# Patient Record
Sex: MALE | Race: WHITE | NOT HISPANIC OR LATINO | Employment: FULL TIME | URBAN - METROPOLITAN AREA
[De-identification: names, ages, dates, MRNs, and addresses within clinical notes are randomized per-mention and may not be internally consistent; named-entity substitution may affect disease eponyms.]

---

## 2018-02-22 ENCOUNTER — OFFICE VISIT CONVERTED (OUTPATIENT)
Dept: SURGERY | Facility: CLINIC | Age: 53
End: 2018-02-22
Attending: SURGERY

## 2018-05-07 ENCOUNTER — OFFICE VISIT CONVERTED (OUTPATIENT)
Dept: SURGERY | Facility: CLINIC | Age: 53
End: 2018-05-07
Attending: SURGERY

## 2018-05-07 ENCOUNTER — CONVERSION ENCOUNTER (OUTPATIENT)
Dept: SURGERY | Facility: CLINIC | Age: 53
End: 2018-05-07

## 2018-12-04 ENCOUNTER — OFFICE VISIT CONVERTED (OUTPATIENT)
Dept: FAMILY MEDICINE CLINIC | Facility: CLINIC | Age: 53
End: 2018-12-04
Attending: NURSE PRACTITIONER

## 2020-03-03 ENCOUNTER — OFFICE VISIT CONVERTED (OUTPATIENT)
Dept: FAMILY MEDICINE CLINIC | Facility: CLINIC | Age: 55
End: 2020-03-03
Attending: NURSE PRACTITIONER

## 2020-03-04 ENCOUNTER — HOSPITAL ENCOUNTER (OUTPATIENT)
Dept: GENERAL RADIOLOGY | Facility: HOSPITAL | Age: 55
Discharge: HOME | End: 2020-03-04
Attending: NURSE PRACTITIONER

## 2020-04-09 ENCOUNTER — OFFICE VISIT CONVERTED (OUTPATIENT)
Dept: ORTHOPEDIC SURGERY | Facility: CLINIC | Age: 55
End: 2020-04-09
Attending: ORTHOPAEDIC SURGERY

## 2020-09-01 ENCOUNTER — OFFICE VISIT CONVERTED (OUTPATIENT)
Dept: FAMILY MEDICINE CLINIC | Facility: CLINIC | Age: 55
End: 2020-09-01
Attending: NURSE PRACTITIONER

## 2020-09-01 ENCOUNTER — HOSPITAL ENCOUNTER (OUTPATIENT)
Dept: GENERAL RADIOLOGY | Facility: HOSPITAL | Age: 55
Discharge: HOME | End: 2020-09-01
Attending: NURSE PRACTITIONER

## 2020-09-11 ENCOUNTER — OFFICE VISIT CONVERTED (OUTPATIENT)
Dept: ORTHOPEDIC SURGERY | Facility: CLINIC | Age: 55
End: 2020-09-11
Attending: ORTHOPAEDIC SURGERY

## 2020-09-11 ENCOUNTER — CONVERSION ENCOUNTER (OUTPATIENT)
Dept: ORTHOPEDIC SURGERY | Facility: CLINIC | Age: 55
End: 2020-09-11

## 2021-05-10 NOTE — H&P
History and Physical      Patient Name: Denny Ratliff   Patient ID: [id number]   Sex: Male   YOB: 1965    Primary Care Provider: Randy GARCIA   Referring Provider: Randy GARCIA    Visit Date: September 11, 2020    Provider: Carl Robertson MD   Location: [facility]   Location Address: [address] [place] [address]  [id number]   Location Phone: [phone]          Chief Complaint  · Right shoulder pain      History Of Present Illness  Denny Ratliff is a 55 year old /White male who presents today to [facility].      The patient presents here today for evaluation of right shoulder.  Patient states he fell to the ground about 6 weeks ago and his pain is not improving. He previously seen his PCP for his shoulder and had x-ray at [facility]. The patient is overall healthy and has no other complaints.       Past Medical History  ***No Significant Medical History; Broken Bones; Incomplete tear of left rotator cuff; Left shoulder pain; Pharyngitis; Subacromial impingement, left         Past Surgical History  Colonoscopy         Medication List  diclofenac sodium 75 mg oral tablet,delayed release (DR/EC)         Allergy List  NO KNOWN DRUG ALLERGIES       Allergies Reconciled  Family Medical History  Diabetes, unspecified type         Social History  Alcohol (Never); Alcohol Use (Never); Caffeine (Current every day); lives with spouse; .; Recreational Drug Use (Never); Second hand smoke exposure (Never); Tobacco (Never); Working         Review of Systems  · Constitutional  o Denies  o : fever, chills, weight loss  · Cardiovascular  o Denies  o : chest pain, shortness of breath  · Gastrointestinal  o Denies  o : liver disease, heartburn, nausea, blood in stools  · Genitourinary  o Denies  o : painful urination, blood in urine  · Integument  o Denies  o : rash, itching  · Neurologic  o Denies  o : headache, weakness, loss of  "consciousness  · Musculoskeletal  o Denies  o : painful, swollen joints  · Psychiatric  o Denies  o : drug/alcohol addiction, anxiety, depression      Vitals  Date Time BP Position Site L\R Cuff Size HR RR TEMP (F) WT  HT  BMI kg/m2 BSA m2 O2 Sat HC       09/11/2020 09:20 AM         165lbs 1oz 6'  1\" 21.78 1.96           Physical Examination  · Constitutional  o Appearance  o : well developed, well-nourished, no obvious deformities present  · Head and Face  o Head  o :   § Inspection  § : normocephalic  o Face  o :   § Inspection  § : no facial lesions  · Eyes  o Conjunctivae  o : conjunctivae normal  o Sclerae  o : sclerae white  · Ears, Nose, Mouth and Throat  o Ears  o :   § External Ears  § : appearance within normal limits  § Hearing  § : intact  o Nose  o :   § External Nose  § : appearance normal  · Neck  o Inspection/Palpation  o : normal appearance  o Range of Motion  o : full range of motion  · Respiratory  o Respiratory Effort  o : breathing unlabored  o Inspection of Chest  o : normal appearance  o Auscultation of Lungs  o : no audible wheezing or rales  · Cardiovascular  o Heart  o : regular rate  · Gastrointestinal  o Abdominal Examination  o : soft and non-tender  · Skin and Subcutaneous Tissue  o General Inspection  o : intact, no rashes  · Psychiatric  o General  o : Alert and oriented x3  o Judgement and Insight  o : judgment and insight intact  o Mood and Affect  o : mood normal, affect appropriate  · Right Shoulder  o Inspection  o : Burning pain deltoid and bicep area. no numbness or tingling. Tender over anterior lateral shoulder. 175 elevation; Abduction 155; ER 90; IR 45. 5/5 Supraspinatus strength; IR T10; 5/5/ infrared subscap; Positive Patricia.   · Injection Note/Aspiration Note  o Site  o : Right shoulder   o Procedure  o : Procedure: After educating the patient, patient gave consent for procedure. After using Chloraprep, the joint space was injected. The patient tolerated the procedure " well.  o Medication  o : 80 mg of DepoMedrol with 9cc of 1% Lidocaine  · Imaging  o Imaging  o : Xray [facility] 09/2020: 1. Mild acromioclavicular osteoarthritis 2. No acute fracture or malalignment           Assessment  · Right shoulder pain, unspecified chronicity     719.41/M25.511  · Rotator cuff tendinitis, right     726.10/M75.81  · Impingement syndrome, shoulder, right     726.2/M75.41  · Acromioclavicular joint arthritis     716.91/M19.019      Plan  · Orders  o Depo-Medrol injection 80mg () - - 09/11/2020   Lot [id number] Exp 07 2021 [employer] Administered by MAUREEN JOHNSON MD  o Shoulder Intra-articular Injection without US Guidance [facility] ([id number]) - - 09/11/2020   Lot [id number] Exp 08 01 2021 [employer] Administered by MAUREEN JOHNSON MD  · Medications  o Medications have been Reconciled  o Transition of Care or Provider Policy  · Instructions  o X-rays reviewed by Dr. Johnson.  o Reviewed the patient's Past Medical, Social, and Family history as well as the ROS at today's visit, no changes.  o Call or return if worsening symptoms.  o Follow up in 6 weeks.  o The above service was scribed by Su Manjarrez on my behalf and I attest to the accuracy of the note. jsb  o Discussed the risks and benefits of a steroid injection in the right shoulder. Patient verbalized understanding and wished to proceed with a steroid injection in the right shoulder at this time. Plan for subacromial injection. The patient tolerated this well. Plan for Activity modifications. Follow up 6-8 weeks. May consider MRI if symptoms persist.   o Electronically Identified Patient Education Materials Provided Electronically            Electronically Signed by: Su Manjarrez MA -Author on September 11, 2020 03:47:47 PM  Electronically Co-signed by: Maureen Johnson MD -Reviewer on September 14, 2020 09:38:59 PM
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
## 2021-05-14 VITALS
SYSTOLIC BLOOD PRESSURE: 118 MMHG | OXYGEN SATURATION: 96 % | DIASTOLIC BLOOD PRESSURE: 74 MMHG | HEART RATE: 79 BPM | BODY MASS INDEX: 23.06 KG/M2 | HEIGHT: 73 IN | TEMPERATURE: 97.5 F | WEIGHT: 174 LBS | RESPIRATION RATE: 18 BRPM

## 2021-05-14 VITALS — BODY MASS INDEX: 21.88 KG/M2 | WEIGHT: 165.06 LBS | HEIGHT: 73 IN

## 2021-05-15 VITALS
HEIGHT: 73 IN | OXYGEN SATURATION: 96 % | HEART RATE: 87 BPM | TEMPERATURE: 97.9 F | BODY MASS INDEX: 21.87 KG/M2 | WEIGHT: 165 LBS | SYSTOLIC BLOOD PRESSURE: 130 MMHG | RESPIRATION RATE: 16 BRPM | DIASTOLIC BLOOD PRESSURE: 82 MMHG

## 2021-05-15 VITALS — RESPIRATION RATE: 16 BRPM | HEIGHT: 73 IN | WEIGHT: 165 LBS | BODY MASS INDEX: 21.87 KG/M2

## 2021-05-16 VITALS
DIASTOLIC BLOOD PRESSURE: 71 MMHG | HEIGHT: 73 IN | OXYGEN SATURATION: 99 % | RESPIRATION RATE: 16 BRPM | BODY MASS INDEX: 20.28 KG/M2 | SYSTOLIC BLOOD PRESSURE: 112 MMHG | HEART RATE: 71 BPM | TEMPERATURE: 97.2 F | WEIGHT: 153 LBS

## 2021-05-16 VITALS — RESPIRATION RATE: 14 BRPM | WEIGHT: 175 LBS | BODY MASS INDEX: 23.19 KG/M2 | HEIGHT: 73 IN

## 2021-05-16 VITALS — BODY MASS INDEX: 23.46 KG/M2 | RESPIRATION RATE: 14 BRPM | HEIGHT: 73 IN | WEIGHT: 177 LBS

## 2021-10-05 ENCOUNTER — OFFICE VISIT (OUTPATIENT)
Dept: FAMILY MEDICINE CLINIC | Facility: CLINIC | Age: 56
End: 2021-10-05

## 2021-10-05 VITALS
RESPIRATION RATE: 18 BRPM | WEIGHT: 183 LBS | BODY MASS INDEX: 24.25 KG/M2 | DIASTOLIC BLOOD PRESSURE: 82 MMHG | SYSTOLIC BLOOD PRESSURE: 130 MMHG | TEMPERATURE: 98 F | OXYGEN SATURATION: 98 % | HEART RATE: 98 BPM | HEIGHT: 73 IN

## 2021-10-05 DIAGNOSIS — J01.00 ACUTE NON-RECURRENT MAXILLARY SINUSITIS: Primary | ICD-10-CM

## 2021-10-05 DIAGNOSIS — J06.9 ACUTE URI: ICD-10-CM

## 2021-10-05 PROCEDURE — 99213 OFFICE O/P EST LOW 20 MIN: CPT | Performed by: STUDENT IN AN ORGANIZED HEALTH CARE EDUCATION/TRAINING PROGRAM

## 2021-10-05 RX ORDER — PREDNISONE 20 MG/1
20 TABLET ORAL DAILY
Qty: 5 TABLET | Refills: 0 | Status: SHIPPED | OUTPATIENT
Start: 2021-10-05 | End: 2021-12-15

## 2021-10-05 RX ORDER — AZITHROMYCIN 250 MG/1
TABLET, FILM COATED ORAL
Qty: 6 TABLET | Refills: 0 | Status: SHIPPED | OUTPATIENT
Start: 2021-10-05 | End: 2021-12-15

## 2021-10-05 NOTE — PROGRESS NOTES
"Chief Complaint  Complaining of sore throat and sinus pressure    Subjective         Denny Ratliff is a 56 y.o. male who presents to [facility]  56 years old male comes to the clinic today for an acute visit.    Patient is complaining of 10 days history of sinus pressure, mild throat irritation and ear discomfort.  Denies any fever/cough/chest pain or shortness of breath.    Denies any sick contact or recent travel.  Patient reports he gets this kind of symptoms once a year and usually resolves with Z-Donaldo and steroid.  Review of Systems   Objective   Vital Signs:   Vitals:    10/05/21 1559   BP: 130/82   Pulse: 98   Resp: 18   Temp: 98 °F (36.7 °C)   SpO2: 98%   Weight: 83 kg (183 lb)   Height: 185.4 cm (73\")      Body mass index is 24.14 kg/m².   Physical Exam  HENT:      Right Ear: Tympanic membrane normal.      Left Ear: Tympanic membrane normal.      Mouth/Throat:      Pharynx: Pharyngeal swelling present.      Comments: Positive sinus tenderness  Pulmonary:      Effort: Pulmonary effort is normal.      Breath sounds: Normal breath sounds.                   Assessment and Plan   Diagnoses and all orders for this visit:    1. Acute non-recurrent maxillary sinusitis (Primary)  -     azithromycin (Zithromax Z-Donaldo) 250 MG tablet; Take 2 tablets by mouth on day 1, then 1 tablet daily on days 2-5  Dispense: 6 tablet; Refill: 0  -     predniSONE (DELTASONE) 20 MG tablet; Take 1 tablet by mouth Daily.  Dispense: 5 tablet; Refill: 0    2. Acute URI  -     azithromycin (Zithromax Z-Donaldo) 250 MG tablet; Take 2 tablets by mouth on day 1, then 1 tablet daily on days 2-5  Dispense: 6 tablet; Refill: 0  -     predniSONE (DELTASONE) 20 MG tablet; Take 1 tablet by mouth Daily.  Dispense: 5 tablet; Refill: 0        Patient declined Covid/flu/strep test  Will empirically treat patient with Z-Doanldo and prednisone; delayed prescription discussed  Patient to call the clinic if no improvement or any " worsening    Follow Up   Return if symptoms worsen or fail to improve.  Patient was given instructions and counseling regarding his condition or for health maintenance advice. Please see specific information pulled into the AVS if appropriate.

## 2021-12-10 ENCOUNTER — TELEPHONE (OUTPATIENT)
Dept: FAMILY MEDICINE CLINIC | Facility: CLINIC | Age: 56
End: 2021-12-10

## 2021-12-10 NOTE — TELEPHONE ENCOUNTER
Informed patient that it would be best if he went to the urgent care for treatment since we do not have any open appointments. He wanted to schedule an appointment for next week and I transferred him up front to do that.

## 2021-12-10 NOTE — TELEPHONE ENCOUNTER
Patient has a rash about 2 inches wide from underneath nipple wrapping all the way to their back.  Patient was trying to get in today advised patient we didn't have openings until next week. I would send a message back and see how he needs to proceed if he needs to make an appt with a provider next week or go to an urgent care if symptoms get uncontrollable.

## 2021-12-12 PROBLEM — M75.42 SUBACROMIAL IMPINGEMENT, LEFT: Status: ACTIVE | Noted: 2017-02-07

## 2021-12-12 PROBLEM — T14.8XXA BROKEN BONES: Status: ACTIVE | Noted: 2021-12-12

## 2021-12-12 PROBLEM — M25.512 LEFT SHOULDER PAIN: Status: ACTIVE | Noted: 2021-12-12

## 2021-12-12 PROBLEM — M75.100 NONTRAUMATIC TEAR OF ROTATOR CUFF: Status: ACTIVE | Noted: 2017-02-07

## 2021-12-15 ENCOUNTER — OFFICE VISIT (OUTPATIENT)
Dept: FAMILY MEDICINE CLINIC | Facility: CLINIC | Age: 56
End: 2021-12-15

## 2021-12-15 VITALS
BODY MASS INDEX: 24.39 KG/M2 | DIASTOLIC BLOOD PRESSURE: 110 MMHG | TEMPERATURE: 97.8 F | WEIGHT: 184 LBS | OXYGEN SATURATION: 98 % | HEART RATE: 74 BPM | SYSTOLIC BLOOD PRESSURE: 152 MMHG | HEIGHT: 73 IN

## 2021-12-15 DIAGNOSIS — B02.9 HERPES ZOSTER WITHOUT COMPLICATION: Primary | ICD-10-CM

## 2021-12-15 PROCEDURE — 80305 DRUG TEST PRSMV DIR OPT OBS: CPT | Performed by: NURSE PRACTITIONER

## 2021-12-15 PROCEDURE — 99213 OFFICE O/P EST LOW 20 MIN: CPT | Performed by: NURSE PRACTITIONER

## 2021-12-15 RX ORDER — GABAPENTIN 300 MG/1
300 CAPSULE ORAL 3 TIMES DAILY PRN
Qty: 60 CAPSULE | Refills: 0 | Status: SHIPPED | OUTPATIENT
Start: 2021-12-15 | End: 2022-01-03

## 2021-12-15 NOTE — PROGRESS NOTES
"Chief Complaint  Herpes Zoster (On right side and back )    Subjective          Denny Ratliff presents to [facility]  Presents to the office today for follow-up regarding his shingles.  Patient states that this started about 2 weeks ago.  He states that he was seen in urgent care where he was placed on Valtrex.  Patient states that the rash is still very uncomfortable.  He states that the area burns and he finds it hard to get comfortable especially when sleeping.  Does still have Valtrex that he is taking.  I did discuss starting gabapentin for the pain.      Objective   Vital Signs:   BP (!) 152/110 (BP Location: Right arm, Patient Position: Sitting, Cuff Size: Adult)   Pulse 74   Temp 97.8 °F (36.6 °C) (Temporal)   Ht 185.4 cm (73\")   Wt 83.5 kg (184 lb)   SpO2 98%   BMI 24.28 kg/m²     Physical Exam  Vitals reviewed.   Constitutional:       Appearance: Normal appearance.   Cardiovascular:      Rate and Rhythm: Normal rate and regular rhythm.      Heart sounds: Normal heart sounds, S1 normal and S2 normal. No murmur heard.      Pulmonary:      Effort: Pulmonary effort is normal. No respiratory distress.      Breath sounds: Normal breath sounds.   Skin:     General: Skin is warm and dry.      Findings: Rash present. Rash is vesicular.             Comments: Vesicular rash noted to the right torso in different stages.     Neurological:      Mental Status: He is alert and oriented to person, place, and time.   Psychiatric:         Attention and Perception: Attention normal.         Mood and Affect: Mood normal.         Behavior: Behavior normal.        Result Review :                Assessment and Plan    Diagnoses and all orders for this visit:    1. Herpes zoster without complication (Primary)  -     gabapentin (Neurontin) 300 MG capsule; Take 1 capsule by mouth 3 (Three) Times a Day As Needed (shingles).  Dispense: 60 capsule; Refill: 0    Continue Valtrex as prescribed. " Instructed the patient to please reach out to the office after Valtrex is completed to update us on the rash    Follow Up   Return if symptoms worsen or fail to improve.  Patient was given instructions and counseling regarding his condition or for health maintenance advice. Please see specific information pulled into the AVS if appropriate.

## 2022-01-03 DIAGNOSIS — B02.9 HERPES ZOSTER WITHOUT COMPLICATION: ICD-10-CM

## 2022-01-03 RX ORDER — GABAPENTIN 300 MG/1
CAPSULE ORAL
Qty: 60 CAPSULE | Refills: 0 | Status: SHIPPED | OUTPATIENT
Start: 2022-01-03

## 2022-10-05 ENCOUNTER — OFFICE VISIT (OUTPATIENT)
Dept: FAMILY MEDICINE CLINIC | Facility: CLINIC | Age: 57
End: 2022-10-05

## 2022-10-05 VITALS
TEMPERATURE: 98 F | DIASTOLIC BLOOD PRESSURE: 66 MMHG | HEART RATE: 93 BPM | OXYGEN SATURATION: 96 % | RESPIRATION RATE: 17 BRPM | SYSTOLIC BLOOD PRESSURE: 116 MMHG

## 2022-10-05 DIAGNOSIS — K59.01 SLOW TRANSIT CONSTIPATION: Primary | ICD-10-CM

## 2022-10-05 PROCEDURE — 99213 OFFICE O/P EST LOW 20 MIN: CPT

## 2022-10-05 NOTE — PROGRESS NOTES
Denny Ratliff presents to [facility] with complaints of constipation.      History of Present Illness  This is a 57-year-old male who presents to clinic with complaints of constipation.    Patient states that his constipation really started a couple months ago, states that he is unsure what really triggered it, but did start taking a stool softener over-the-counter which seems to make his bowel movements more regular.  States that he does have a bowel movement about every day, regular in consistency, and states that his constipation is almost completely resolved.  States the main concern is that he knows that colon cancer can cause constipation, and thus came in for evaluation.  Denies any blood in his stool, does have recent colonoscopy about 4 years ago which had no abnormalities noted.  Denies any nausea or vomiting or abdominal pain, denies any other associated symptoms.  Does state that he drinks about 2 gallons of tea a day, as well as sodas.  Diet is high in carbohydrates.    The following portions of the patient's history were personally reviewed and updated as appropriate: allergies, current medications, past medical history, past surgical history, past family history, and past social history.       Objective   Vital Signs:   /66   Pulse 93   Temp 98 °F (36.7 °C)   Resp 17   SpO2 96%     There is no height or weight on file to calculate BMI.    All labs, imaging, test results, and specialty provider notes reviewed with patient.     Physical Exam  Vitals reviewed.   Constitutional:       Appearance: Normal appearance.   Cardiovascular:      Rate and Rhythm: Normal rate and regular rhythm.      Pulses: Normal pulses.      Heart sounds: Normal heart sounds.   Pulmonary:      Effort: Pulmonary effort is normal.      Breath sounds: Normal breath sounds.   Neurological:      General: No focal deficit present.      Mental Status: He is alert and oriented to person,  place, and time.            Assessment and Plan:  Diagnoses and all orders for this visit:    1. Slow transit constipation (Primary)    Discussed with patient that likely his cause of constipation is because he is not drinking enough water, is getting dehydrated, and also because of his diet.  Discussed with patient to increase his water intake, adjust his diet to include foods low in carbs, increase his vegetable and fruit intake.  Did also discussed with patient to follow-up with his PCP soon, as he can send him to GI for further evaluation and possible repeat colonoscopy if colon cancer is a concern of his.  Do not think that this is a risk, as he did just have a normal colonoscopy about 4 years ago.      Follow Up:  Return in about 4 weeks (around 11/2/2022), or WITH pcp.    Patient was given instructions and counseling regarding his condition or for health maintenance advice. Please see specific information pulled into the AVS if appropriate.

## 2024-01-25 ENCOUNTER — OFFICE VISIT (OUTPATIENT)
Dept: ORTHOPEDIC SURGERY | Facility: CLINIC | Age: 59
End: 2024-01-25
Payer: COMMERCIAL

## 2024-01-25 VITALS
OXYGEN SATURATION: 95 % | DIASTOLIC BLOOD PRESSURE: 77 MMHG | HEART RATE: 93 BPM | HEIGHT: 73 IN | WEIGHT: 184 LBS | BODY MASS INDEX: 24.39 KG/M2 | SYSTOLIC BLOOD PRESSURE: 120 MMHG

## 2024-01-25 DIAGNOSIS — M25.562 LEFT KNEE PAIN, UNSPECIFIED CHRONICITY: Primary | ICD-10-CM

## 2024-01-25 DIAGNOSIS — M11.20 CHONDROCALCINOSIS: ICD-10-CM

## 2024-01-25 DIAGNOSIS — M17.12 PRIMARY OSTEOARTHRITIS OF LEFT KNEE: ICD-10-CM

## 2024-01-25 RX ORDER — LIDOCAINE HYDROCHLORIDE 10 MG/ML
5 INJECTION, SOLUTION INFILTRATION; PERINEURAL
Status: COMPLETED | OUTPATIENT
Start: 2024-01-25 | End: 2024-01-25

## 2024-01-25 RX ORDER — TRIAMCINOLONE ACETONIDE 40 MG/ML
5 INJECTION, SUSPENSION INTRA-ARTICULAR; INTRAMUSCULAR
Status: COMPLETED | OUTPATIENT
Start: 2024-01-25 | End: 2024-01-25

## 2024-01-25 RX ADMIN — LIDOCAINE HYDROCHLORIDE 5 ML: 10 INJECTION, SOLUTION INFILTRATION; PERINEURAL at 15:15

## 2024-01-25 RX ADMIN — TRIAMCINOLONE ACETONIDE 5 MG: 40 INJECTION, SUSPENSION INTRA-ARTICULAR; INTRAMUSCULAR at 15:15

## 2024-01-25 NOTE — PROGRESS NOTES
"Chief Complaint  Initial Evaluation of the Left Knee     Subjective      Denny Ratliff presents to [facility] for evaluation of the left knee. He reports he strained his left knee a couple weeks ago. He has tried resting the knee without relief. He locates pain to the medial and anterior medial knee. He reports previous scope surgery 30 years ago.     No Known Allergies     Social History     Socioeconomic History    Marital status:    Tobacco Use    Smoking status: Never    Smokeless tobacco: Never   Vaping Use    Vaping Use: Never used   Substance and Sexual Activity    Alcohol use: Not Currently    Drug use: Never    Sexual activity: Defer        I reviewed the patient's chief complaint, history of present illness, review of systems, past medical history, surgical history, family history, social history, medications, and allergy list.     Review of Systems     Constitutional: Denies fevers, chills, weight loss  Cardiovascular: Denies chest pain, shortness of breath  Skin: Denies rashes, acute skin changes  Neurologic: Denies headache, loss of consciousness  MSK: Left knee pain      Vital Signs:   /77   Pulse 93   Ht 185.4 cm (73\")   Wt 83.5 kg (184 lb)   SpO2 95%   BMI 24.28 kg/m²          Physical Exam  General: Alert. No acute distress    Ortho Exam      Left knee- Positive EHL, FHL, GS and TA. Sensation intact to all 5 nerves of the foot. Positive pulses. Well healed scope scars. ROM -5 to 130 degrees. Stable to varus/valgus stress. Stable to anterior/posterior drawer. Negative Lachman's. Negative Kiah's.     Left knee: L knee  Date/Time: 1/25/2024 3:15 PM  Consent given by: patient  Site marked: site marked  Timeout: Immediately prior to procedure a time out was called to verify the correct patient, procedure, equipment, support staff and site/side marked as required   Supporting Documentation  Indications: pain   Procedure Details  Location: knee - L " knee  Needle gauge: 21g.  Medications administered: 5 mL lidocaine 1 %; 5 mg triamcinolone acetonide 40 MG/ML  Patient tolerance: patient tolerated the procedure well with no immediate complications          X-Ray Report:  Left knee X-Ray  Indication: Evaluation of left knee pain  AP/Lateral, Standing, and Bryson view(s)  Findings: no acute fracture. Chondrocalcinosis to the medial and lateral compartments. Mild to moderate degenerative changes, Mild medial joint space narrowing. No effusion.  Prior studies available for comparison: yes       Imaging Results (Most Recent)       Procedure Component Value Units Date/Time    XR Knee 3 View Left [[id number]] Resulted: 01/25/24 1450     Updated: 01/25/24 1453             Result Review :       No results found.           Assessment and Plan     Diagnoses and all orders for this visit:    1. Left knee pain, unspecified chronicity (Primary)  -     XR Knee 3 View Left    2. Primary osteoarthritis of left knee    3. Chondrocalcinosis        Discussed the treatment plan with the patient.   I reviewed the x-rays that were obtained today with the patient. Plan for conservative treatment at this time. Discussed the risks and benefits of a left knee steroid injection. The patient expressed understanding and wished to proceed. He tolerated the injection well.     Call or return if worsening symptoms.    Follow Up     PRN      Patient was given instructions and counseling regarding his condition or for health maintenance advice. Please see specific information pulled into the AVS if appropriate.     Scribed for Carl Robertson MD by Su Manjarrez.  01/25/24   14:53 EST    I have personally performed the services described in this document as scribed by the above individual and it is both accurate and complete. Carl Robertson MD 01/25/24

## 2025-08-26 ENCOUNTER — OFFICE VISIT (OUTPATIENT)
Dept: ORTHOPEDIC SURGERY | Facility: CLINIC | Age: 60
End: 2025-08-26
Payer: COMMERCIAL

## 2025-08-26 VITALS
OXYGEN SATURATION: 97 % | BODY MASS INDEX: 23.19 KG/M2 | HEIGHT: 73 IN | DIASTOLIC BLOOD PRESSURE: 87 MMHG | SYSTOLIC BLOOD PRESSURE: 126 MMHG | HEART RATE: 79 BPM | WEIGHT: 175 LBS

## 2025-08-26 DIAGNOSIS — M17.12 PRIMARY OSTEOARTHRITIS OF LEFT KNEE: Primary | ICD-10-CM

## 2025-08-26 RX ORDER — TRIAMCINOLONE ACETONIDE 40 MG/ML
40 INJECTION, SUSPENSION INTRA-ARTICULAR; INTRAMUSCULAR
Status: COMPLETED | OUTPATIENT
Start: 2025-08-26 | End: 2025-08-26

## 2025-08-26 RX ORDER — LIDOCAINE HYDROCHLORIDE 10 MG/ML
5 INJECTION, SOLUTION INFILTRATION; PERINEURAL
Status: COMPLETED | OUTPATIENT
Start: 2025-08-26 | End: 2025-08-26

## 2025-08-26 RX ADMIN — TRIAMCINOLONE ACETONIDE 40 MG: 40 INJECTION, SUSPENSION INTRA-ARTICULAR; INTRAMUSCULAR at 08:35

## 2025-08-26 RX ADMIN — LIDOCAINE HYDROCHLORIDE 5 ML: 10 INJECTION, SOLUTION INFILTRATION; PERINEURAL at 08:35
